# Patient Record
Sex: MALE | Race: WHITE
[De-identification: names, ages, dates, MRNs, and addresses within clinical notes are randomized per-mention and may not be internally consistent; named-entity substitution may affect disease eponyms.]

---

## 2019-12-15 ENCOUNTER — HOSPITAL ENCOUNTER (EMERGENCY)
Dept: HOSPITAL 47 - EC | Age: 49
Discharge: LEFT BEFORE DISCHARGE | End: 2019-12-15
Payer: COMMERCIAL

## 2019-12-15 ENCOUNTER — HOSPITAL ENCOUNTER (EMERGENCY)
Dept: HOSPITAL 47 - EC | Age: 49
Discharge: HOME | End: 2019-12-15
Payer: COMMERCIAL

## 2019-12-15 VITALS
RESPIRATION RATE: 18 BRPM | SYSTOLIC BLOOD PRESSURE: 138 MMHG | TEMPERATURE: 98.4 F | HEART RATE: 86 BPM | DIASTOLIC BLOOD PRESSURE: 90 MMHG

## 2019-12-15 VITALS
HEART RATE: 84 BPM | TEMPERATURE: 98.3 F | RESPIRATION RATE: 18 BRPM | SYSTOLIC BLOOD PRESSURE: 126 MMHG | DIASTOLIC BLOOD PRESSURE: 84 MMHG

## 2019-12-15 DIAGNOSIS — Z53.29: ICD-10-CM
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
## 2019-12-15 DIAGNOSIS — F10.129: Primary | ICD-10-CM

## 2019-12-15 DIAGNOSIS — S00.531A: ICD-10-CM
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
## 2019-12-15 DIAGNOSIS — F17.200: ICD-10-CM

## 2019-12-15 DIAGNOSIS — S00.511A: ICD-10-CM

## 2019-12-15 DIAGNOSIS — Z53.8: ICD-10-CM

## 2019-12-15 LAB
ANION GAP SERPL CALC-SCNC: 10 MMOL/L
APTT BLD: 21.1 SEC (ref 22–30)
BASOPHILS # BLD AUTO: 0.1 K/UL (ref 0–0.2)
BASOPHILS NFR BLD AUTO: 1 %
BUN SERPL-SCNC: 16 MG/DL (ref 9–20)
CALCIUM SPEC-MCNC: 9.9 MG/DL (ref 8.4–10.2)
CHLORIDE SERPL-SCNC: 106 MMOL/L (ref 98–107)
CO2 SERPL-SCNC: 28 MMOL/L (ref 22–30)
EOSINOPHIL # BLD AUTO: 0.2 K/UL (ref 0–0.7)
EOSINOPHIL NFR BLD AUTO: 1 %
ERYTHROCYTE [DISTWIDTH] IN BLOOD BY AUTOMATED COUNT: 4.41 M/UL (ref 4.3–5.9)
ERYTHROCYTE [DISTWIDTH] IN BLOOD: 14.2 % (ref 11.5–15.5)
GLUCOSE SERPL-MCNC: 100 MG/DL (ref 74–99)
HCT VFR BLD AUTO: 43.5 % (ref 39–53)
HGB BLD-MCNC: 14.7 GM/DL (ref 13–17.5)
INR PPP: 0.9 (ref ?–1.2)
LYMPHOCYTES # SPEC AUTO: 1.8 K/UL (ref 1–4.8)
LYMPHOCYTES NFR SPEC AUTO: 17 %
MAGNESIUM SPEC-SCNC: 1.9 MG/DL (ref 1.6–2.3)
MCH RBC QN AUTO: 33.3 PG (ref 25–35)
MCHC RBC AUTO-ENTMCNC: 33.7 G/DL (ref 31–37)
MCV RBC AUTO: 98.6 FL (ref 80–100)
MONOCYTES # BLD AUTO: 0.5 K/UL (ref 0–1)
MONOCYTES NFR BLD AUTO: 5 %
NEUTROPHILS # BLD AUTO: 7.9 K/UL (ref 1.3–7.7)
NEUTROPHILS NFR BLD AUTO: 75 %
PLATELET # BLD AUTO: 255 K/UL (ref 150–450)
POTASSIUM SERPL-SCNC: 4.5 MMOL/L (ref 3.5–5.1)
PT BLD: 9.9 SEC (ref 9–12)
SODIUM SERPL-SCNC: 144 MMOL/L (ref 137–145)
WBC # BLD AUTO: 10.6 K/UL (ref 3.8–10.6)

## 2019-12-15 PROCEDURE — 85025 COMPLETE CBC W/AUTO DIFF WBC: CPT

## 2019-12-15 PROCEDURE — 85610 PROTHROMBIN TIME: CPT

## 2019-12-15 PROCEDURE — 72125 CT NECK SPINE W/O DYE: CPT

## 2019-12-15 PROCEDURE — 70450 CT HEAD/BRAIN W/O DYE: CPT

## 2019-12-15 PROCEDURE — 71045 X-RAY EXAM CHEST 1 VIEW: CPT

## 2019-12-15 PROCEDURE — 80048 BASIC METABOLIC PNL TOTAL CA: CPT

## 2019-12-15 PROCEDURE — 99285 EMERGENCY DEPT VISIT HI MDM: CPT

## 2019-12-15 PROCEDURE — 36415 COLL VENOUS BLD VENIPUNCTURE: CPT

## 2019-12-15 PROCEDURE — 80320 DRUG SCREEN QUANTALCOHOLS: CPT

## 2019-12-15 PROCEDURE — 85730 THROMBOPLASTIN TIME PARTIAL: CPT

## 2019-12-15 PROCEDURE — 70486 CT MAXILLOFACIAL W/O DYE: CPT

## 2019-12-15 PROCEDURE — 96372 THER/PROPH/DIAG INJ SC/IM: CPT

## 2019-12-15 PROCEDURE — 83735 ASSAY OF MAGNESIUM: CPT

## 2019-12-15 NOTE — ED
General Adult HPI





- General


Chief complaint: Alcohol


Stated complaint: alcohol withdrawal


Time Seen by Provider: 12/15/19 12:37


Source: patient


Mode of arrival: wheelchair


Limitations: no limitations





- History of Present Illness


Initial comments: 





Dictation was produced using dragon dictation software. please excuse any 

grammatical, word or spelling errors. 





Chief Complaint: 48-year-old daily alcoholic presents with head injury and 

alcohol withdrawals.





History of Present Illness: 48-year-old male who presents today with family 

member.  Patient is a daily alcoholic.  He states he drinks one fifth of liquor 

a day.  Patient states he's had severe withdrawals in the past.  Last alcohol 

intake was earlier today.  Patient however reports that he is withdrawing 

actively right now.  He was brought to [facility] to get into their detox 

program however upon hearing that patient fell recently he was instead diverted 

to the emergency department for medical evaluation.  He fell yesterday.  Does 

not really quite recall the event however he does remember hurting his lip.  

Patient is a poor historian.  He is not sure feels consciousness after the fall.








The ROS documented in this emergency department record has been reviewed and 

confirmed by me.  Those systems with pertinent positive or negative responses 

have been documented in the HPI.  All other systems are other negative and/or 

noncontributory.








PHYSICAL EXAM:


General Impression: Alert and oriented x3, not in acute distress


HEENT: Normocephalic atraumatic, extra-ocular movements intact, pupils equal and

reactive to light bilaterally, mucous membranes moist, abrasion to the upper lip

without any laceration 


Cardiovascular: Heart regular rate and rhythm, S1&S2 audible, no murmurs, rubs 

or gallops


Chest: Lungs clear to auscultation bilaterally, no rhonchi, no wheeze, no rales


Abdomen: Bowel sounds present, abdomen soft, non-tender, non-distended, no or

ganomegaly


Musculoskeletal: Pulses present and equal in all extremities, no peripheral 

edema


Motor:  no focal deficits noted


Neurological: CN II-XII grossly intact, no focal motor or sensory deficits noted


Skin: Intact with no visualized rashes


Psych: Normal affect and mood





ED course: 47 yo Male presents with a twitch intoxication and head injury after 

fall yesterday.  Vital signs upon arrival are within acceptable limits.Currently

patient does not appear inebriated.  He is coherent and ambulatory at baseline. 

Patient not showing any signs of gait ataxia.  Patient became upset when he was 

not given Ativan immediately.  He reports that he wants to be discharge.  Risk 

and benefits were discussed with patient.  Patient wants to leave AGAINST 

MEDICAL ADVICE.  Patient understands that he could have significant intracranial

or traumatic injury fall yesterday.  Patient understands that he could have 

significant permanent comorbidities and possibly mortality without appropriate 

workup.  Patient understands and would rather leave.  Patient had elevated 

breath alcohol test however medically he appears coherent and stable.  Given the

patient is a chronic alcoholic is is likely his baseline.  He has family to 

assist him.  Return primary discussed.  Patient be signed out AMA.


Risks, Benefits, and Treatment alternatives were discussed in detail with the 

patient. The patient is alert and oriented X 3 and has the capacity to make an 

informed decision. The risks of increased morbidity including the possibly of 

death were explained to and understood by the patient who is choosing to leave 

against medical advice. The patient is encouraged to return any time should they

want further treatment and diagnostic investigation.











- Related Data


                                    Allergies











Allergy/AdvReac Type Severity Reaction Status Date / Time


 


No Known Allergies Allergy   Verified 12/15/19 12:34














Review of Systems


ROS Statement: 


Those systems with pertinent positive or pertinent negative responses have been 

documented in the HPI.





ROS Other: All systems not noted in ROS Statement are negative.





Past Medical History


Past Medical History: No Reported History


History of Any Multi-Drug Resistant Organisms: None Reported


Past Surgical History: No Surgical Hx Reported


Past Psychological History: No Psychological Hx Reported


Smoking Status: Current every day smoker


Past Alcohol Use History: Abuse


Past Drug Use History: None Reported





General Exam


Limitations: no limitations





Course


                                   Vital Signs











  12/15/19





  12:30


 


Temperature 98.3 F


 


Pulse Rate 84


 


Respiratory 18





Rate 


 


Blood Pressure 126/84


 


O2 Sat by Pulse 98





Oximetry 














Disposition


Clinical Impression: 


 Alcoholic intoxication





Disposition: Left Against Medical Advice


Condition: Fair


Instructions (If sedation given, give patient instructions):  Alcohol 

Intoxication (ED)


Referrals: 


None,Stated [Primary Care Provider] - 1-2 days


Time of Disposition: 13:25

## 2019-12-15 NOTE — XR
EXAMINATION TYPE: XR chest 1V portable

 

DATE OF EXAM: 12/15/2019

 

HISTORY: fall.

 

REFERENCE: NONE.

 

FINDINGS: The lungs are clear. Pleural space are clear. Heart size is upper limits of normal.

 

IMPRESSION: 

 

NO ACTIVE INTRATHORACIC DISEASE.

## 2019-12-15 NOTE — ED
General Adult HPI





- General


Chief complaint: Alcohol


Stated complaint: Alcohol withdrawl


Time Seen by Provider: 12/15/19 14:43


Source: patient


Mode of arrival: ambulatory


Limitations: no limitations





- History of Present Illness


Initial comments: 





Dictation was produced using dragon dictation software. please excuse any 

grammatical, word or spelling errors. 





Chief Complaint: 48-year-old male presents with concerns of alcohol withdrawal.





History of Present Illness: 48-year-old male he was a made earlier today.  He 

checks back into the waiting room for concerns of alcohol withdrawal.  Please 

refer to another documentation earlier today performed by myself for more 

detailed HPI.  Briefly, patient suffered a fall yesterday.  Patient is a chronic

alcoholic.  When seen earlier at the emergency department by myself patient 

wanted to leave AGAINST MEDICAL ADVICE despite not getting adequate imaging 

studies.








The ROS documented in this emergency department record has been reviewed and 

confirmed by me.  Those systems with pertinent positive or negative responses 

have been documented in the HPI.  All other systems are other negative and/or 

noncontributory.








PHYSICAL EXAM:


General Impression: Alert and oriented x3, not in acute distress


HEENT: Normocephalic atraumatic, extra-ocular movements intact, pupils equal and

reactive to light bilaterally, mucous membranes moist, ecchymoses and abrasion 

to the upper lip


Cardiovascular: Heart regular rate and rhythm, S1&S2 audible, no murmurs, rubs 

or gallops


Chest: Lungs clear to auscultation bilaterally, no rhonchi, no wheeze, no rales


Abdomen: Bowel sounds present, abdomen soft, non-tender, non-distended, no 

organomegaly


Musculoskeletal: Pulses present and equal in all extremities, no peripheral 

edema


Motor:  no focal deficits noted


Neurological: CN II-XII grossly intact, no focal motor or sensory deficits noted


Skin: Intact with no visualized rashes


Psych: Normal affect and mood





ED course: 48 -year-old male presents with acute EtOH intoxication.  He was seen

earlier in emergency department.  Signs upon arrival are within acceptable 

limits.  Patient labs from earlier today are within normal limits except for his

alcohol was measured at 346.  Despite the degree of elevation patient ambulatory

without complication and clinically appears at baseline according to him and 

family member.CT imaging was obtained showing no acute processes.  Patient 

cleared to be admitted to [facility] alcohol detox facility.  Labs were 

reviewed from earlier evaluation.  At time of discharge patient ambulatory at 

baseline.

















- Related Data


                                    Allergies











Allergy/AdvReac Type Severity Reaction Status Date / Time


 


No Known Allergies Allergy   Verified 12/15/19 12:34














Review of Systems


ROS Statement: 


Those systems with pertinent positive or pertinent negative responses have been 

documented in the HPI.





ROS Other: All systems not noted in ROS Statement are negative.





Past Medical History


Past Medical History: No Reported History


History of Any Multi-Drug Resistant Organisms: None Reported


Past Surgical History: No Surgical Hx Reported


Past Psychological History: No Psychological Hx Reported


Smoking Status: Current every day smoker


Past Alcohol Use History: Abuse


Past Drug Use History: None Reported





General Exam


Limitations: no limitations





Course


                                   Vital Signs











  12/15/19





  14:38


 


Temperature 98.4 F


 


Pulse Rate 86


 


Respiratory 18





Rate 


 


Blood Pressure 138/90


 


O2 Sat by Pulse 98





Oximetry 














Disposition


Clinical Impression: 


 Alcohol intoxication





Disposition: HOME SELF-CARE


Condition: Good


Instructions (If sedation given, give patient instructions):  Alcohol 

Intoxication (ED)


Additional Instructions: 


go directly to [facility]


Is patient prescribed a controlled substance at d/c from ED?: No


Referrals: 


None,Stated [Primary Care Provider] - 1-2 days


Time of Disposition: 15:58

## 2019-12-15 NOTE — CT
EXAMINATION TYPE: CT brain orion louise con

 

DATE OF EXAM: 12/15/2019

 

COMPARISON: None

 

HISTORY: pt fall, hitting front of face

 

CT DLP: 1295.6 mGycm

Automated exposure control for dose reduction was used.

 

There is mild cerebral atrophy. There is no mass effect nor midline shift. There is no sign of intrac
ranial hemorrhage. The calvarium is intact. There is some debris in the left external auditory canal.


 

Cervical vertebra have normal alignment. Disc spaces are fairly normal. Posterior elements are intact
. The skull base is intact.

 

IMPRESSION:

Negative CT scan of the brain.

 

Negative CT scan cervical spine.

## 2019-12-15 NOTE — CT
EXAMINATION TYPE: CT facial bones wo con

 

DATE OF EXAM: 12/15/2019

 

COMPARISON: None

 

HISTORY: pt fall, hitting front of face

Pain

CT DLP: 1295.6 mGycm

Automated exposure control for dose reduction was used.

 

Findings

 

Mandibular ring is intact. Temporomandibular joints are intact. Zygomatic arches appear normal. The m
axilla is intact. Nasal bone appears intact. There is fairly normal aeration of the paranasal sinuses
. There is no evidence of a blowout fracture. Orbital margins are intact. There is no retro-orbital m
ass. There is minimal soft tissue swelling over the right frontal bone. There is small area of mucosa
l thickening left frontal sinus. There is pneumatization of the left middle nasal turbinate.

 

IMPRESSION:

Right frontal scalp soft tissue mild swelling. No fracture.